# Patient Record
Sex: MALE | Race: WHITE
[De-identification: names, ages, dates, MRNs, and addresses within clinical notes are randomized per-mention and may not be internally consistent; named-entity substitution may affect disease eponyms.]

---

## 2017-12-10 ENCOUNTER — HOSPITAL ENCOUNTER (EMERGENCY)
Dept: HOSPITAL 25 - UCCORT | Age: 22
Discharge: HOME | End: 2017-12-10
Payer: COMMERCIAL

## 2017-12-10 VITALS — SYSTOLIC BLOOD PRESSURE: 134 MMHG | DIASTOLIC BLOOD PRESSURE: 78 MMHG

## 2017-12-10 DIAGNOSIS — J03.90: Primary | ICD-10-CM

## 2017-12-10 PROCEDURE — G0463 HOSPITAL OUTPT CLINIC VISIT: HCPCS

## 2017-12-10 PROCEDURE — 99212 OFFICE O/P EST SF 10 MIN: CPT

## 2017-12-10 PROCEDURE — 87651 STREP A DNA AMP PROBE: CPT

## 2017-12-10 NOTE — UC
Throat Pain/Nasal Doug HPI





- HPI Summary


HPI Summary: 





Sore throat and fever since yesterday. He has no cough and no other symptoms. 

No chronic medical conditions. He has had Penicillin without allergy. 





- History of Current Complaint


Chief Complaint: UCGeneralIllness


Stated Complaint: ST


Time Seen by Provider: 12/10/17 09:40


Hx Obtained From: Patient


Onset/Duration: Sudden Onset, Lasting Hours


Severity: Moderate


Cough: Nonproductive


Associated Signs & Symptoms: Positive: Dysphagia, Fever, Vomiting.  Negative: 

FB Sensation, Drooling, Wheezing, Sinus Discomfort, Rash





- Allergies/Home Medications


Allergies/Adverse Reactions: 


 Allergies











Allergy/AdvReac Type Severity Reaction Status Date / Time


 


Cefprozil [From Cefzil] Allergy Intermediate Hives Verified 12/10/17 09:33











Home Medications: 


 Home Medications





Acetaminophen TAB* [Tylenol TAB*] 650 mg PO Q4H PRN 12/10/17 [History Confirmed 

12/10/17]











PMH/Surg Hx/FS Hx/Imm Hx


Previously Healthy: Yes





- Surgical History


Surgical History: None





- Family History


Known Family History: Positive: Other - NO known related family history of 

strep throat.





- Social History


Occupation: Student


Alcohol Use: Weekly


Substance Use Type: None


Smoking Status (MU): Never Smoked Tobacco





- Immunization History


Most Recent Influenza Vaccination: November 2017





Review of Systems


Constitutional: Fever


ENT: Sore Throat


Respiratory: Negative


All Other Systems Reviewed And Are Negative: Yes





Physical Exam


Triage Information Reviewed: Yes


Appearance: Well-Appearing, No Pain Distress, Well-Nourished


Vital Signs: 


 Initial Vital Signs











Temp  100.3 F   12/10/17 09:32


 


Pulse  108   12/10/17 09:32


 


Resp  16   12/10/17 09:32


 


BP  134/78   12/10/17 09:32


 


Pulse Ox  98   12/10/17 09:32











Vital Signs Reviewed: Yes


Eyes: Positive: Conjunctiva Clear


ENT: Positive: Pharyngeal erythema, TMs normal, Tonsillar swelling, Tonsillar 

exudate, Muffled voice, Uvula midline.  Negative: Nasal congestion, Nasal 

drainage, Trismus, Hoarse voice


Neck: Positive: Supple, Nontender, No Lymphadenopathy


Respiratory: Positive: Chest non-tender, Lungs clear, Normal breath sounds, No 

respiratory distress, No accessory muscle use, Crackles, Rhonchi.  Negative: 

Respiratory distress, Decreased breath sounds, Accessory muscle use


Cardiovascular: Positive: RRR, No Murmur, Pulses Normal.  Negative: Brisk 

Capillary Refill


Abdomen Description: Positive: Nontender, No Organomegaly, Soft.  Negative: 

Distended, Guarding


Musculoskeletal: Positive: Strength Intact, ROM Intact.  Negative: No Edema


Neurological: Positive: Alert, Muscle Tone Normal, Fatigued


Skin Exam: Normal


Skin: Positive: rashes





Throat Pain/Nasal Course/Dx





- Differential Dx/Diagnosis


Provider Diagnoses: tonsillitis.





Discharge





- Discharge Plan


Condition: Good


Disposition: HOME


Prescriptions: 


Amoxicillin/Clavulanate TAB* [Augmentin *] 875 mg PO BID #20 tab


Patient Education Materials:  Strep Throat (ED), Tonsillitis (ED)


Referrals: 


Non Staff,Doctor [Primary Care Provider] - 


Additional Instructions: 


return here for any worsening.

## 2018-04-04 ENCOUNTER — HOSPITAL ENCOUNTER (EMERGENCY)
Dept: HOSPITAL 25 - UCCORT | Age: 23
Discharge: HOME | End: 2018-04-04
Payer: COMMERCIAL

## 2018-04-04 VITALS — DIASTOLIC BLOOD PRESSURE: 80 MMHG | SYSTOLIC BLOOD PRESSURE: 125 MMHG

## 2018-04-04 DIAGNOSIS — J02.0: Primary | ICD-10-CM

## 2018-04-04 DIAGNOSIS — Z88.8: ICD-10-CM

## 2018-04-04 PROCEDURE — 99212 OFFICE O/P EST SF 10 MIN: CPT

## 2018-04-04 PROCEDURE — G0463 HOSPITAL OUTPT CLINIC VISIT: HCPCS

## 2018-04-04 PROCEDURE — 87651 STREP A DNA AMP PROBE: CPT

## 2018-04-04 NOTE — UC
Throat Pain/Nasal Doug HPI





- HPI Summary


HPI Summary: 





Sore throat starting last night. Worse today. NO fever or chills. 





- History of Current Complaint


Chief Complaint: UCRespiratory


Stated Complaint: SORE THROAT


Time Seen by Provider: 04/04/18 08:13


Hx Obtained From: Patient


Onset/Duration: Gradual Onset, Lasting Hours


Severity: Moderate


Pain Intensity: 7


Cough: None


Associated Signs & Symptoms: Positive: Dysphagia





- Allergies/Home Medications


Allergies/Adverse Reactions: 


 Allergies











Allergy/AdvReac Type Severity Reaction Status Date / Time


 


cefprozil [From Cefzil] Allergy  Hives Verified 04/04/18 08:06














PMH/Surg Hx/FS Hx/Imm Hx


Previously Healthy: No - tonsillitis about two months ago and he was on 

amoxacillin he states. 





- Surgical History


Surgical History: None





- Family History


Known Family History: Positive: Other - NO known related family history of 

strep throat.





- Social History


Alcohol Use: Weekly


Substance Use Type: None


Smoking Status (MU): Never Smoked Tobacco





- Immunization History


Most Recent Influenza Vaccination: November 2017





Review of Systems


ENT: Sore Throat


All Other Systems Reviewed And Are Negative: Yes





Physical Exam


Triage Information Reviewed: Yes


Appearance: Well-Appearing, No Pain Distress, Well-Nourished


Vital Signs: 


 Initial Vital Signs











Temp  98.5 F   04/04/18 08:08


 


Pulse  88   04/04/18 08:08


 


Resp  16   04/04/18 08:08


 


BP  125/80   04/04/18 08:08


 


Pulse Ox  98   04/04/18 08:08











Eyes: Positive: Conjunctiva Clear.  Negative: Conjunctiva Inflamed


ENT: Positive: Pharyngeal erythema, TMs normal, Tonsillar swelling, Tonsillar 

exudate, Uvula midline.  Negative: Nasal congestion, Trismus, Muffled voice, 

Sinus tenderness


Neck: Positive: Supple, Nontender, No Lymphadenopathy


Respiratory: Positive: Lungs clear, Normal breath sounds, No respiratory 

distress, No accessory muscle use.  Negative: Respiratory distress, Decreased 

breath sounds, Accessory muscle use, Crackles, Rhonchi, Stridor


Cardiovascular: Positive: No Murmur, Pulses Normal, Brisk Capillary Refill


Abdomen Description: Positive: No Organomegaly, Soft.  Negative: Distended, 

Guarding


Musculoskeletal: Positive: Strength Intact, ROM Intact, No Edema


Neurological: Positive: Alert, Muscle Tone Normal.  Negative: Fatigued


Psychological: Positive: Age Appropriate Behavior


Skin: Negative: rashes





Throat Pain/Nasal Course/Dx





- Differential Dx/Diagnosis


Provider Diagnoses: strep throat.





Discharge





- Sign-Out/Discharge


Documenting (check all that apply): Discharge





- Discharge Plan


Condition: Good


Disposition: HOME


Prescriptions: 


Amoxicillin/Clavulanate TAB* [Augmentin *] 875 mg PO BID #20 tab


Patient Education Materials:  Tonsillitis (ED)


Forms:  *School Release


Referrals: 


Non Staff,Doctor [Primary Care Provider] - 





- Billing Disposition and Condition


Condition: GOOD


Disposition: HOME